# Patient Record
Sex: FEMALE | Race: WHITE | NOT HISPANIC OR LATINO | Employment: OTHER | URBAN - METROPOLITAN AREA
[De-identification: names, ages, dates, MRNs, and addresses within clinical notes are randomized per-mention and may not be internally consistent; named-entity substitution may affect disease eponyms.]

---

## 2023-05-17 DIAGNOSIS — R19.5 POSITIVE COLORECTAL CANCER SCREENING USING COLOGUARD TEST: Primary | ICD-10-CM

## 2023-05-17 DIAGNOSIS — Z86.010 HISTORY OF COLON POLYPS: ICD-10-CM

## 2023-06-21 NOTE — PROGRESS NOTES
Clinic Note    Reason for visit:  The primary encounter diagnosis was Dysphagia, unspecified type. Diagnoses of Chronic idiopathic constipation, Positive colorectal cancer screening using Cologuard test, and History of colon polyps were also pertinent to this visit.    PCP: Eze Mishra   [address]  / [place] [address]    HPI:  This is a 82 y.o. female who is established- las seen 11/2016. Here for positive Cologuard.She does take a herbal laxative (puritans pride) daily for daily BM. She has had to take a laxative all of her life. Denies any F/H CRC, unintentional weight loss. She does report dysphagia mainly with pills. Having intermittent heartburn with inciting foods. Denies n/v, melena. She wishes to have an upper endoscopy with dilation.    4/28/2023 +Cologuard    11/16/2016 Colonoscopy- 5 polyps, medium IH TA, TVA, repeat colon in 2 years  She does have hx of Polyp with HGD in 2007      Review of Systems   Constitutional:  Negative for fatigue, fever and unexpected weight change.   HENT:  Negative for mouth sores, postnasal drip, sore throat and trouble swallowing.    Eyes:  Negative for pain, discharge and eye dryness.   Respiratory:  Negative for apnea, cough, choking, chest tightness, shortness of breath and wheezing.    Cardiovascular:  Negative for chest pain, palpitations and leg swelling.   Gastrointestinal:  Negative for abdominal distention, abdominal pain, anal bleeding, blood in stool, change in bowel habit, constipation, diarrhea, nausea, rectal pain, vomiting, reflux, fecal incontinence and change in bowel habit.   Genitourinary:  Negative for bladder incontinence, dysuria and hematuria.   Musculoskeletal:  Negative for arthralgias, back pain and joint swelling.   Integumentary:  Negative for color change and rash.   Allergic/Immunologic: Negative for environmental allergies and food allergies.   Neurological:  Negative for seizures and headaches.   Hematological:  Negative for  "adenopathy. Does not bruise/bleed easily.      Past Medical History:   Diagnosis Date    Depression     Dyslipidemia     HTN (hypertension)     Hypothyroidism, unspecified      Past Surgical History:   Procedure Laterality Date    EYE SURGERY Bilateral     HIP ARTHROPLASTY Bilateral     HYSTERECTOMY       Family History   Problem Relation Age of Onset    Febrile seizures Mother     Heart failure Father      Social History     Tobacco Use    Smoking status: Never    Smokeless tobacco: Never   Substance Use Topics    Alcohol use: Yes     Alcohol/week: 1.0 standard drink     Types: 1 Glasses of wine per week     Comment: 1 glass wine daily    Drug use: Never     Review of patient's allergies indicates:  No Known Allergies       Medication List with Changes/Refills   New Medications    SOD SULF-POT CHLORIDE-MAG SULF (SUTAB) 1.479-0.188- 0.225 GRAM TABLET    Take 12 tablets by mouth once daily. Take according to package instructions with indicated amount of water. No breakfast day before test. May substitute with Suprep, Clenpiq, Plenvu, Moviprep or GoLytely based on Rx plan and patient preference.   Current Medications    HYDROCHLOROTHIAZIDE (HYDRODIURIL) 12.5 MG TAB    Take 12.5 mg by mouth.    IPRATROPIUM (ATROVENT) 42 MCG (0.06 %) NASAL SPRAY        LEVOTHYROXINE (SYNTHROID) 75 MCG TABLET    Take 75 mcg by mouth.    METOPROLOL SUCCINATE (TOPROL-XL) 100 MG 24 HR TABLET    Take 100 mg by mouth.    POTASSIUM CHLORIDE (K-TAB) 20 MEQ    Take 20 mEq by mouth.    ROSUVASTATIN (CRESTOR) 10 MG TABLET    Take 10 mg by mouth.    VENLAFAXINE (EFFEXOR-XR) 75 MG 24 HR CAPSULE    Take 75 mg by mouth.        Vital Signs:  /72 (BP Location: Right arm, Patient Position: Sitting, BP Method: Medium (Automatic))   Pulse 63   Ht 5' 6" (1.676 m)   Wt 64.9 kg (143 lb)   SpO2 98%   BMI 23.08 kg/m²         Physical Exam  Vitals reviewed.   Constitutional:       General: She is awake. She is not in acute distress.     Appearance: " Normal appearance. She is well-developed. She is not ill-appearing, toxic-appearing or diaphoretic.   HENT:      Head: Normocephalic and atraumatic.      Nose: Nose normal.      Mouth/Throat:      Mouth: Mucous membranes are moist.      Pharynx: Oropharynx is clear. No oropharyngeal exudate or posterior oropharyngeal erythema.   Eyes:      General: Lids are normal. Gaze aligned appropriately. No scleral icterus.        Right eye: No discharge.         Left eye: No discharge.      Conjunctiva/sclera: Conjunctivae normal.   Neck:      Trachea: Trachea normal.   Cardiovascular:      Rate and Rhythm: Normal rate and regular rhythm.      Pulses:           Radial pulses are 2+ on the right side and 2+ on the left side.   Pulmonary:      Effort: Pulmonary effort is normal. No respiratory distress.      Breath sounds: No stridor. No wheezing.   Chest:      Chest wall: No tenderness.   Abdominal:      General: Bowel sounds are normal. There is no distension.      Palpations: Abdomen is soft. There is no fluid wave, hepatomegaly or mass.      Tenderness: There is no abdominal tenderness. There is no guarding or rebound.   Musculoskeletal:         General: No tenderness or deformity.      Cervical back: Full passive range of motion without pain and neck supple. No tenderness.      Right lower leg: No edema.      Left lower leg: No edema.   Lymphadenopathy:      Cervical: No cervical adenopathy.   Skin:     General: Skin is warm and dry.      Capillary Refill: Capillary refill takes less than 2 seconds.      Coloration: Skin is not cyanotic, jaundiced or pale.   Neurological:      General: No focal deficit present.      Mental Status: She is alert and oriented to person, place, and time.      Motor: No tremor.   Psychiatric:         Attention and Perception: Attention normal.         Mood and Affect: Mood and affect normal.         Speech: Speech normal.         Behavior: Behavior normal. Behavior is cooperative.          All  of the data above and below has been reviewed by myself and any further interpretations will be reflected in the assessment and plan.   The data includes review of external notes, and independent interpretation of lab results, procedures, x-rays, and imaging reports.      Assessment:  Dysphagia, unspecified type  -     Ambulatory Referral to External Surgery    Chronic idiopathic constipation    Positive colorectal cancer screening using Cologuard test  -     Ambulatory referral/consult to Gastroenterology  -     Ambulatory Referral to External Surgery    History of colon polyps  -     Ambulatory referral/consult to Gastroenterology  -     sod sulf-pot chloride-mag sulf (SUTAB) 1.479-0.188- 0.225 gram tablet; Take 12 tablets by mouth once daily. Take according to package instructions with indicated amount of water. No breakfast day before test. May substitute with Suprep, Clenpiq, Plenvu, Moviprep or GoLytely based on Rx plan and patient preference.  Dispense: 24 tablet; Refill: 0  -     Ambulatory Referral to External Surgery    + Cologuard- is over due for colonoscopy. She also has history of polyp w/ HGD in 2007. Will plan for repeat colonoscopy.  Constipation- on OTC laxative- has had to take this all her life. Discussed that this laxative does have senna and may be habit forming. She verb understanding.  Dysphagia- only with pills. Will plan for upper endoscopy to evaluate for stricture. Will dilate if able.     Recommendations:    Schedule Upper and lower endoscopy with Dr. Muro at [facility] with Sutab.      Risks, benefits, and alternatives of medical management, any associated procedures, and/or treatment discussed with the patient. Patient given opportunity to ask questions and voices understanding. Patient has elected to proceed with the recommended care modalities as discussed.    Instructed patient to notify my office if they have not been contacted within two weeks after any procedures, submitting any  samples (biopsies, blood, stool, urine, etc.) or after any imaging (X-ray, CT, MRI, etc.).     Follow up in about 1 year (around 6/22/2024).    Order summary:  Orders Placed This Encounter   Procedures    Ambulatory Referral to External Surgery          This document may have been created using a voice recognition transcribing system. Incorrect words or phrases may have been missed during proofreading. Please interpret accordingly or contact me for clarification.     Luz Maurice, NP

## 2023-06-22 ENCOUNTER — OFFICE VISIT (OUTPATIENT)
Dept: GASTROENTEROLOGY | Facility: CLINIC | Age: 83
End: 2023-06-22
Payer: MEDICARE

## 2023-06-22 VITALS
BODY MASS INDEX: 22.98 KG/M2 | SYSTOLIC BLOOD PRESSURE: 110 MMHG | HEART RATE: 63 BPM | WEIGHT: 143 LBS | DIASTOLIC BLOOD PRESSURE: 72 MMHG | HEIGHT: 66 IN | OXYGEN SATURATION: 98 %

## 2023-06-22 DIAGNOSIS — R13.10 DYSPHAGIA, UNSPECIFIED TYPE: Primary | ICD-10-CM

## 2023-06-22 DIAGNOSIS — K59.04 CHRONIC IDIOPATHIC CONSTIPATION: ICD-10-CM

## 2023-06-22 DIAGNOSIS — Z86.010 HISTORY OF COLON POLYPS: ICD-10-CM

## 2023-06-22 DIAGNOSIS — R19.5 POSITIVE COLORECTAL CANCER SCREENING USING COLOGUARD TEST: ICD-10-CM

## 2023-06-22 PROCEDURE — 99204 PR OFFICE/OUTPT VISIT, NEW, LEVL IV, 45-59 MIN: ICD-10-PCS | Mod: S$GLB,,, | Performed by: NURSE PRACTITIONER

## 2023-06-22 PROCEDURE — 99204 OFFICE O/P NEW MOD 45 MIN: CPT | Mod: S$GLB,,, | Performed by: NURSE PRACTITIONER

## 2023-06-22 RX ORDER — SOD SULF/POT CHLORIDE/MAG SULF 1.479 G
12 TABLET ORAL DAILY
Qty: 24 TABLET | Refills: 0 | Status: SHIPPED | OUTPATIENT
Start: 2023-06-22

## 2023-06-22 RX ORDER — HYDROCHLOROTHIAZIDE 12.5 MG/1
12.5 TABLET ORAL
COMMUNITY
Start: 2023-03-17

## 2023-06-22 RX ORDER — VENLAFAXINE HYDROCHLORIDE 75 MG/1
75 CAPSULE, EXTENDED RELEASE ORAL
COMMUNITY
Start: 2023-04-07

## 2023-06-22 RX ORDER — POTASSIUM CHLORIDE 1500 MG/1
20 TABLET, EXTENDED RELEASE ORAL
COMMUNITY
Start: 2023-03-17

## 2023-06-22 RX ORDER — ROSUVASTATIN CALCIUM 10 MG/1
10 TABLET, COATED ORAL
COMMUNITY
Start: 2023-05-01

## 2023-06-22 RX ORDER — METOPROLOL SUCCINATE 100 MG/1
100 TABLET, EXTENDED RELEASE ORAL
COMMUNITY
Start: 2023-03-17

## 2023-06-22 RX ORDER — LEVOTHYROXINE SODIUM 75 UG/1
75 TABLET ORAL
COMMUNITY
Start: 2023-05-25

## 2023-06-22 RX ORDER — IPRATROPIUM BROMIDE 42 UG/1
SPRAY, METERED NASAL
COMMUNITY
Start: 2023-06-20

## 2023-06-22 NOTE — PATIENT INSTRUCTIONS
Schedule Upper and lower endoscopy with Dr. Muro     Please notify my office if you have not been contacted within two weeks after any procedures, submitting any samples (biopsies, blood, stool, urine, etc.) or after any imaging (X-ray, CT, MRI, etc.).

## 2023-08-10 ENCOUNTER — TELEPHONE (OUTPATIENT)
Dept: GASTROENTEROLOGY | Facility: CLINIC | Age: 83
End: 2023-08-10
Payer: MEDICARE

## 2023-08-10 VITALS — HEIGHT: 66 IN | BODY MASS INDEX: 22.98 KG/M2 | WEIGHT: 143 LBS

## 2023-08-10 DIAGNOSIS — R19.5 POSITIVE COLORECTAL CANCER SCREENING USING COLOGUARD TEST: ICD-10-CM

## 2023-08-10 DIAGNOSIS — R13.10 DYSPHAGIA, UNSPECIFIED TYPE: Primary | ICD-10-CM

## 2023-08-10 DIAGNOSIS — Z86.010 HISTORY OF COLON POLYPS: ICD-10-CM

## 2023-08-10 NOTE — TELEPHONE ENCOUNTER
"[facility]  [address] [place] [address]  Phone: [phone]  Fax: [phone]    History & Physical         Provider: Dr. Lucero Muro    Patient Name: Hannah FLEMING (age):1940  83 y.o.           Gender: female   Phone: [phone]     Referring Physician: Eze Mishra     Vital Signs:   Height - 5' 6"  Weight - 143 lb  BMI -  23.08    Plan: EGD w/DIL/Colonoscopy @ [facility]    Encounter Diagnoses   Name Primary?    Dysphagia, unspecified type Yes    Positive colorectal cancer screening using Cologuard test     History of colon polyps            History:      Past Medical History:   Diagnosis Date    Depression     Dyslipidemia     HTN (hypertension)     Hypothyroidism, unspecified       Past Surgical History:   Procedure Laterality Date    EYE SURGERY Bilateral     HIP ARTHROPLASTY Bilateral     HYSTERECTOMY        Medication List with Changes/Refills   Current Medications    HYDROCHLOROTHIAZIDE (HYDRODIURIL) 12.5 MG TAB    Take 12.5 mg by mouth.    IPRATROPIUM (ATROVENT) 42 MCG (0.06 %) NASAL SPRAY        LEVOTHYROXINE (SYNTHROID) 75 MCG TABLET    Take 75 mcg by mouth.    METOPROLOL SUCCINATE (TOPROL-XL) 100 MG 24 HR TABLET    Take 100 mg by mouth.    POTASSIUM CHLORIDE (K-TAB) 20 MEQ    Take 20 mEq by mouth.    ROSUVASTATIN (CRESTOR) 10 MG TABLET    Take 10 mg by mouth.    SOD SULF-POT CHLORIDE-MAG SULF (SUTAB) 1.479-0.188- 0.225 GRAM TABLET    Take 12 tablets by mouth once daily. Take according to package instructions with indicated amount of water. No breakfast day before test. May substitute with Suprep, Clenpiq, Plenvu, Moviprep or GoLytely based on Rx plan and patient preference.    VENLAFAXINE (EFFEXOR-XR) 75 MG 24 HR CAPSULE    Take 75 mg by mouth.      Review of patient's allergies indicates:  No Known Allergies   Family History   Problem Relation Age of Onset    Febrile seizures " Mother     Heart failure Father       Social History     Tobacco Use    Smoking status: Never    Smokeless tobacco: Never   Substance Use Topics    Alcohol use: Yes     Alcohol/week: 1.0 standard drink of alcohol     Types: 1 Glasses of wine per week     Comment: 1 glass wine daily    Drug use: Never        Physical Examination:     General Appearance:___________________________  HEENT: _____________________________________  Abdomen:____________________________________  Heart:________________________________________  Lungs:_______________________________________  Extremities:___________________________________  Skin:_________________________________________  Endocrine:____________________________________  Genitourinary:_________________________________  Neurological:__________________________________      Patient has been evaluated immediately prior to sedation and is medically cleared for endoscopy with IVCS as an ASA class: ______      Physician Signature: _________________________       Date: ________  Time: ________

## 2023-08-16 ENCOUNTER — OUTSIDE PLACE OF SERVICE (OUTPATIENT)
Dept: GASTROENTEROLOGY | Facility: CLINIC | Age: 83
End: 2023-08-16

## 2023-08-16 PROCEDURE — 43239 PR EGD, FLEX, W/BIOPSY, SGL/MULTI: ICD-10-PCS | Mod: 51,,, | Performed by: INTERNAL MEDICINE

## 2023-08-16 PROCEDURE — 45385 PR COLONOSCOPY,REMV LESN,SNARE: ICD-10-PCS | Mod: KX,,, | Performed by: INTERNAL MEDICINE

## 2023-08-16 PROCEDURE — 45385 COLONOSCOPY W/LESION REMOVAL: CPT | Mod: KX,,, | Performed by: INTERNAL MEDICINE

## 2023-08-16 PROCEDURE — 43239 EGD BIOPSY SINGLE/MULTIPLE: CPT | Mod: 51,,, | Performed by: INTERNAL MEDICINE

## 2023-08-16 PROCEDURE — 45380 PR COLONOSCOPY,BIOPSY: ICD-10-PCS | Mod: KX,59,, | Performed by: INTERNAL MEDICINE

## 2023-08-16 PROCEDURE — 45380 COLONOSCOPY AND BIOPSY: CPT | Mod: KX,59,, | Performed by: INTERNAL MEDICINE

## 2023-08-17 LAB — CRC RECOMMENDATION EXT: NORMAL

## 2023-08-24 ENCOUNTER — TELEPHONE (OUTPATIENT)
Dept: GASTROENTEROLOGY | Facility: CLINIC | Age: 83
End: 2023-08-24
Payer: MEDICARE

## 2023-08-24 DIAGNOSIS — K63.5 POLYP OF COLON, UNSPECIFIED PART OF COLON, UNSPECIFIED TYPE: Primary | ICD-10-CM

## 2023-08-24 NOTE — TELEPHONE ENCOUNTER
DBx chr foc act ditis, GBx react w/o Hp, EBx reflux, 15 polyps: 14 TA. DC lesion Bx TVA.    I will review with patient. Recommend repeat colonoscopy for EMR attempt and additional polypectomies with aggressive prep, adult colonoscope and no NSAIDs.  NBP

## 2023-08-28 RX ORDER — SOD SULF/POT CHLORIDE/MAG SULF 1.479 G
12 TABLET ORAL DAILY
Qty: 24 TABLET | Refills: 0 | Status: SHIPPED | OUTPATIENT
Start: 2023-08-28

## 2023-08-28 NOTE — TELEPHONE ENCOUNTER
Discussed with patient directly.  She agrees to repeat colonoscopy November 29, 2023 Wednesday.  She will do 2 days of clear liquids and MiraLax 1 capful 3 times a day the week before her next colonoscopy.  She will  her Sutab prescription and keep for November.  No NSAIDs the 2 weeks prior.  She requests instructions are written down and mailed to her home address. Add to Epic schedule.  СВЕТЛАНА

## 2023-08-29 NOTE — TELEPHONE ENCOUNTER
Wrote down on pt prep instructions 2 days of clear liquids and MiraLax 1 capful 3 times a day the week before her next colonoscopy. No NSAIDs the 2 weeks prior. Put in the mail and added to [facility] 11/29/23 schedule. joanne

## 2023-09-29 ENCOUNTER — DOCUMENTATION ONLY (OUTPATIENT)
Dept: GASTROENTEROLOGY | Facility: CLINIC | Age: 83
End: 2023-09-29
Payer: MEDICARE

## 2023-11-21 ENCOUNTER — TELEPHONE (OUTPATIENT)
Dept: GASTROENTEROLOGY | Facility: CLINIC | Age: 83
End: 2023-11-21
Payer: MEDICARE

## 2023-11-21 VITALS — WEIGHT: 143 LBS | BODY MASS INDEX: 22.98 KG/M2 | HEIGHT: 66 IN

## 2023-11-21 DIAGNOSIS — K63.5 POLYP OF COLON, UNSPECIFIED PART OF COLON, UNSPECIFIED TYPE: Primary | ICD-10-CM

## 2023-11-21 NOTE — TELEPHONE ENCOUNTER
[facility]  [address] [place] [address]  Phone: [phone]  Fax: [phone]    History & Physical         Provider: Dr. Lucero Muro    Patient Name: Hannah FLEMING (age):1940  83 y.o.           Gender: female   Phone: [phone]     Referring Physician: Eze Mishra     Vital Signs:   Height - 5' 6''  Weight - 143 LB  BMI - 23.08      Plan: Colonoscopy w/adult colonoscope and resection of       polyps @ [facility]    Encounter Diagnosis   Name Primary?    Polyp of colon, unspecified part of colon, unspecified type Yes           History:      Past Medical History:   Diagnosis Date    Depression     Dyslipidemia     HTN (hypertension)     Hypothyroidism, unspecified       Past Surgical History:   Procedure Laterality Date    EYE SURGERY Bilateral     HIP ARTHROPLASTY Bilateral     HYSTERECTOMY        Medication List with Changes/Refills   Current Medications    HYDROCHLOROTHIAZIDE (HYDRODIURIL) 12.5 MG TAB    Take 12.5 mg by mouth.    IPRATROPIUM (ATROVENT) 42 MCG (0.06 %) NASAL SPRAY        LEVOTHYROXINE (SYNTHROID) 75 MCG TABLET    Take 75 mcg by mouth.    METOPROLOL SUCCINATE (TOPROL-XL) 100 MG 24 HR TABLET    Take 100 mg by mouth.    POTASSIUM CHLORIDE (K-TAB) 20 MEQ    Take 20 mEq by mouth.    ROSUVASTATIN (CRESTOR) 10 MG TABLET    Take 10 mg by mouth.    SOD SULF-POT CHLORIDE-MAG SULF (SUTAB) 1.479-0.188- 0.225 GRAM TABLET    Take 12 tablets by mouth once daily. Take according to package instructions with indicated amount of water. No breakfast day before test. May substitute with Suprep, Clenpiq, Plenvu, Moviprep or GoLytely based on Rx plan and patient preference.    SOD SULF-POT CHLORIDE-MAG SULF (SUTAB) 1.479-0.188- 0.225 GRAM TABLET    Take 12 tablets by mouth once daily. Take according to package instructions with indicated amount of water. No breakfast day before test. May  substitute with Suprep, Clenpiq, Plenvu, Moviprep or GoLytely based on Rx plan and patient preference.    VENLAFAXINE (EFFEXOR-XR) 75 MG 24 HR CAPSULE    Take 75 mg by mouth.      Review of patient's allergies indicates:  No Known Allergies   Family History   Problem Relation Age of Onset    Febrile seizures Mother     Heart failure Father       Social History     Tobacco Use    Smoking status: Never    Smokeless tobacco: Never   Substance Use Topics    Alcohol use: Yes     Alcohol/week: 1.0 standard drink of alcohol     Types: 1 Glasses of wine per week     Comment: 1 glass wine daily    Drug use: Never        Physical Examination:     General Appearance:___________________________  HEENT: _____________________________________  Abdomen:____________________________________  Heart:________________________________________  Lungs:_______________________________________  Extremities:___________________________________  Skin:_________________________________________  Endocrine:____________________________________  Genitourinary:_________________________________  Neurological:__________________________________      Patient has been evaluated immediately prior to sedation and is medically cleared for endoscopy with IVCS as an ASA class: ______      Physician Signature: _________________________       Date: ________  Time: ________

## 2023-11-27 NOTE — TELEPHONE ENCOUNTER
Raghu/ pt and told her that I was calling as a courtesy regarding her upcoming Colonoscopy with NBP on 11/29/23 Wed and wanted to verify that she had her prep instructions and meds. Pt stated she has both and is ready for the procedure. She even mentioned that she started the 2 day CLD  and MiraLAX. I also mentioned that [facility] will call her on Tuesday with her arrival time and the GI Lab is located on the third floor. joanne   No

## 2023-11-29 ENCOUNTER — OUTSIDE PLACE OF SERVICE (OUTPATIENT)
Dept: GASTROENTEROLOGY | Facility: CLINIC | Age: 83
End: 2023-11-29
Payer: MEDICARE

## 2023-11-29 LAB — CRC RECOMMENDATION EXT: NORMAL

## 2023-11-29 PROCEDURE — 45385 COLONOSCOPY W/LESION REMOVAL: CPT | Mod: ,,, | Performed by: INTERNAL MEDICINE

## 2023-11-29 PROCEDURE — 45385 PR COLONOSCOPY,REMV LESN,SNARE: ICD-10-PCS | Mod: ,,, | Performed by: INTERNAL MEDICINE

## 2023-11-30 ENCOUNTER — TELEPHONE (OUTPATIENT)
Dept: GASTROENTEROLOGY | Facility: CLINIC | Age: 83
End: 2023-11-30

## 2023-11-30 NOTE — TELEPHONE ENCOUNTER
Send genetics referral for greater than 20 colonic polyps.  Notify patient that I would like for her to try Linzess 290 mcg samples.  She takes some once a day.  The goal would be to get her off of the over-the-counter stimulant laxative she is currently taking.  Awaiting pathology results before contacting her with the above.  NBP

## 2023-12-14 ENCOUNTER — TELEPHONE (OUTPATIENT)
Dept: GASTROENTEROLOGY | Facility: CLINIC | Age: 83
End: 2023-12-14
Payer: MEDICARE

## 2023-12-15 NOTE — TELEPHONE ENCOUNTER
18 polyps (5 >1 cm): 1 TVA (19mm piecemeal), 16 TA, 1 hyp.    Notify patient that her colon polyps were benign. I rec genetic testing given 30+ precancerous polyps. Will discuss repeat colonoscopy with aggressive prep and adult colonoscope at tertiary center after those results have returned. Send referral.  NBP

## 2023-12-21 ENCOUNTER — DOCUMENTATION ONLY (OUTPATIENT)
Dept: GASTROENTEROLOGY | Facility: CLINIC | Age: 83
End: 2023-12-21
Payer: MEDICARE

## 2024-02-26 ENCOUNTER — TELEPHONE (OUTPATIENT)
Dept: GASTROENTEROLOGY | Facility: CLINIC | Age: 84
End: 2024-02-26

## 2024-02-27 NOTE — TELEPHONE ENCOUNTER
I was notified last month that patient qualified for genetic testing. Did she submit that sample? If so, then get result from [facility].  СВЕТЛАНА

## 2024-03-01 NOTE — TELEPHONE ENCOUNTER
Called patient and she stated she was contacted by the genetic testing office. She did go to the office and answer some questions. They told her they contact her after that visit and patient stated she has not heard anything. 3/1/24 SENIAA

## 2024-05-20 ENCOUNTER — TELEPHONE (OUTPATIENT)
Dept: GASTROENTEROLOGY | Facility: CLINIC | Age: 84
End: 2024-05-20

## 2024-05-27 NOTE — TELEPHONE ENCOUNTER
4/8/2024 Ivanna gates.    Notify patient that I reviewed her genetics results which were normal. I recommend she is referred to a tertiary center to be evaluated for her follow up colonoscopy (with aggressive prep and adult colonoscope). If she agrees, then may send referral. Does she have a preference on location?  NBP  
Called and spoke with patient gave her results, she states that she does nit have a preference of where she would want to go but she said where ever we decide to send her to let her know first so she can decide if she wants to go first or not .  reema  
Joo with Dr. Sanches (advanced endoscopist). Okay to send referral if she agrees.  СВЕТЛАНА  
Pt states she is not sure about moving forward with referral and asked that we mail her all this information.     Dr. Juan Francisco Sanches MD  Gastroenterology  [address], [place] [address] [phone]   Office Hours  Mon - Fri 7:30 AM - 4:00 PM  
[facility]

## 2024-06-24 NOTE — PROGRESS NOTES
Clinic Note    Reason for visit:  The primary encounter diagnosis was Constipation, unspecified constipation type. A diagnosis of History of colon polyps was also pertinent to this visit.    PCP: Eze Mishra       HPI:  This is a 83 y.o. female here for follow up. Patient with multiple colon polyps. Nees referral to Dr. Sanches. She still has constipation. Does not have a BM unless she takes senna laxative. She has been out of her laxative and has been taking MiraLax 1 capful daily. She will only have a BM with enema. No blood in stool.    4/8/2024 Colaris AP nl     Colonoscopy 11/29/2023 18 polyps (5 >1 cm): 1 TVA (19mm piecemeal), 16 TA, 1 hyp. Repeat colonoscopy in 3-6 months with advanced endoscopist.    EGD/Colonoscopy 8/17/2023 DBx chr foc act ditis, GBx react w/o Hp, EBx reflux, melanosis coli, 15 polyps: 14 TA. DC lesion Bx TVA.     hx of Polyp with HGD in 2007     Review of Systems   Constitutional:  Positive for diaphoresis. Negative for fatigue, fever and unexpected weight change.   HENT:  Negative for mouth sores, postnasal drip, sore throat and trouble swallowing.    Eyes:  Negative for pain, discharge and eye dryness.   Respiratory:  Negative for apnea, cough, choking, chest tightness, shortness of breath and wheezing.    Cardiovascular:  Negative for chest pain, palpitations and leg swelling.   Gastrointestinal:  Positive for constipation and fecal incontinence. Negative for abdominal distention, abdominal pain, anal bleeding, blood in stool, change in bowel habit, diarrhea, nausea, rectal pain, vomiting and reflux.   Genitourinary:  Negative for bladder incontinence, dysuria and hematuria.   Musculoskeletal:  Negative for arthralgias, back pain and joint swelling.   Integumentary:  Negative for color change and rash.   Allergic/Immunologic: Negative for environmental allergies and food allergies.   Neurological:  Negative for seizures and headaches.   Hematological:  Negative for adenopathy.  "Does not bruise/bleed easily.        Past Medical History:   Diagnosis Date    Depression     Dyslipidemia     High cholesterol     HTN (hypertension)     Hypothyroidism, unspecified     Personal history of colonic polyps     Colaris AP nl     Past Surgical History:   Procedure Laterality Date    EYE SURGERY Bilateral     HIP ARTHROPLASTY Bilateral     HYSTERECTOMY       Family History   Problem Relation Name Age of Onset    Febrile seizures Mother      Heart failure Father      Colon cancer Neg Hx      Crohn's disease Neg Hx      Esophageal cancer Neg Hx      Irritable bowel syndrome Neg Hx      Liver cancer Neg Hx      Liver disease Neg Hx      Stomach cancer Neg Hx      Rectal cancer Neg Hx       Social History     Tobacco Use    Smoking status: Never    Smokeless tobacco: Never   Substance Use Topics    Alcohol use: Yes     Alcohol/week: 1.0 standard drink of alcohol     Types: 1 Glasses of wine per week     Comment: 1 glass wine daily    Drug use: Never     Review of patient's allergies indicates:  No Known Allergies     Medication List with Changes/Refills   Current Medications    HYDROCHLOROTHIAZIDE (HYDRODIURIL) 12.5 MG TAB    Take 12.5 mg by mouth.    IPRATROPIUM (ATROVENT) 42 MCG (0.06 %) NASAL SPRAY        LEVOTHYROXINE (SYNTHROID) 75 MCG TABLET    Take 75 mcg by mouth.    METOPROLOL SUCCINATE (TOPROL-XL) 100 MG 24 HR TABLET    Take 100 mg by mouth.    POTASSIUM CHLORIDE (K-TAB) 20 MEQ    Take 20 mEq by mouth.    ROSUVASTATIN (CRESTOR) 10 MG TABLET    Take 10 mg by mouth.    VENLAFAXINE (EFFEXOR-XR) 75 MG 24 HR CAPSULE    Take 75 mg by mouth.         Vital Signs:  /69 (BP Location: Left arm, Patient Position: Sitting)   Pulse 68   Ht 5' 6" (1.676 m)   Wt 64.1 kg (141 lb 6.4 oz)   SpO2 98%   BMI 22.82 kg/m²         Physical Exam  Vitals reviewed.   Constitutional:       General: She is awake. She is not in acute distress.     Appearance: Normal appearance. She is well-developed. She is not " ill-appearing, toxic-appearing or diaphoretic.   HENT:      Head: Normocephalic and atraumatic.      Nose: Nose normal.      Mouth/Throat:      Mouth: Mucous membranes are moist.      Pharynx: Oropharynx is clear. No oropharyngeal exudate or posterior oropharyngeal erythema.   Eyes:      General: Lids are normal. Gaze aligned appropriately. No scleral icterus.        Right eye: No discharge.         Left eye: No discharge.      Conjunctiva/sclera: Conjunctivae normal.   Neck:      Trachea: Trachea normal.   Cardiovascular:      Rate and Rhythm: Normal rate and regular rhythm.      Pulses:           Radial pulses are 2+ on the right side and 2+ on the left side.   Pulmonary:      Effort: Pulmonary effort is normal. No respiratory distress.      Breath sounds: No stridor. No wheezing.   Chest:      Chest wall: No tenderness.   Abdominal:      General: Bowel sounds are normal. There is no distension.      Palpations: Abdomen is soft. There is no fluid wave, hepatomegaly or mass.      Tenderness: There is no abdominal tenderness. There is no guarding or rebound.   Musculoskeletal:         General: No tenderness or deformity.      Cervical back: Full passive range of motion without pain and neck supple. No tenderness.      Right lower leg: No edema.      Left lower leg: No edema.   Lymphadenopathy:      Cervical: No cervical adenopathy.   Skin:     General: Skin is warm and dry.      Capillary Refill: Capillary refill takes less than 2 seconds.      Coloration: Skin is not cyanotic, jaundiced or pale.   Neurological:      General: No focal deficit present.      Mental Status: She is alert and oriented to person, place, and time.      Motor: No tremor.   Psychiatric:         Attention and Perception: Attention normal.         Mood and Affect: Mood and affect normal.         Speech: Speech normal.         Behavior: Behavior normal. Behavior is cooperative.            All of the data above and below has been reviewed by  myself and any further interpretations will be reflected in the assessment and plan.   The data includes review of external notes, and independent interpretation of lab results, procedures, x-rays, and imaging reports.      Assessment:  Constipation, unspecified constipation type    History of colon polyps  -     Ambulatory referral/consult to Gastroenterology; Future; Expected date: 07/02/2024         Hx multiple colon polyps. >30 in 1 year. Needs referral to advanced endoscopist.  Constiaption. Will give samples of linzess 290mcg.     Recommendations:    Refer to Dr. Sanches.  Samples given of Linzess 290mcg. Take once daily. Let us know if this works well for you and will send out prescription.    Risks, benefits, and alternatives of medical management, any associated procedures, and/or treatment discussed with the patient. Patient given opportunity to ask questions and voices understanding. Patient has elected to proceed with the recommended care modalities as discussed.    Instructed patient to notify my office if they have not been contacted within two weeks after any procedures, submitting any samples (biopsies, blood, stool, urine, etc.) or after any imaging (X-ray, CT, MRI, etc.).     Follow up in about 1 year (around 6/25/2025).    Order summary:  Orders Placed This Encounter   Procedures    Ambulatory referral/consult to Gastroenterology      This assessment, plan, and documentation was performed in collaboration with Luz Maurice NP.     This document may have been created using a voice recognition transcribing system. Incorrect words or phrases may have been missed during proofreading. Please interpret accordingly or contact me for clarification.     Lucero Muro MD

## 2024-06-25 ENCOUNTER — OFFICE VISIT (OUTPATIENT)
Dept: GASTROENTEROLOGY | Facility: CLINIC | Age: 84
End: 2024-06-25
Payer: MEDICARE

## 2024-06-25 ENCOUNTER — TELEPHONE (OUTPATIENT)
Dept: GASTROENTEROLOGY | Facility: CLINIC | Age: 84
End: 2024-06-25

## 2024-06-25 VITALS
WEIGHT: 141.38 LBS | SYSTOLIC BLOOD PRESSURE: 125 MMHG | HEART RATE: 68 BPM | HEIGHT: 66 IN | DIASTOLIC BLOOD PRESSURE: 69 MMHG | BODY MASS INDEX: 22.72 KG/M2 | OXYGEN SATURATION: 98 %

## 2024-06-25 DIAGNOSIS — Z86.010 HISTORY OF COLON POLYPS: ICD-10-CM

## 2024-06-25 DIAGNOSIS — K59.00 CONSTIPATION, UNSPECIFIED CONSTIPATION TYPE: Primary | ICD-10-CM

## 2024-06-25 PROCEDURE — 99214 OFFICE O/P EST MOD 30 MIN: CPT | Mod: S$GLB,,, | Performed by: INTERNAL MEDICINE

## 2024-06-25 NOTE — TELEPHONE ENCOUNTER
Referral for Dr. Benjamin was faxed to his office with patients ov notes, colon report, and path report. 6/25/24 LRA

## 2024-06-25 NOTE — PATIENT INSTRUCTIONS
Refer to Dr. Sanches.  Samples given of Linzess [facility]. Take once daily. Let us know if this works well for you and will send out prescription    Please notify my office if you have not been contacted within two weeks after any procedures, submitting any samples (biopsies, blood, stool, urine, etc.) or after any imaging (X-ray, CT, MRI, etc.).

## 2024-07-26 ENCOUNTER — TELEPHONE (OUTPATIENT)
Dept: GASTROENTEROLOGY | Facility: CLINIC | Age: 84
End: 2024-07-26
Payer: MEDICARE

## 2024-07-26 NOTE — TELEPHONE ENCOUNTER
Dr. Mishra's office called and asked us to fax over the last colon report and last labs. Patients records was faxed over. 7/26/24 LRA

## 2024-07-29 ENCOUNTER — TELEPHONE (OUTPATIENT)
Dept: GASTROENTEROLOGY | Facility: CLINIC | Age: 84
End: 2024-07-29
Payer: MEDICARE

## 2024-07-30 NOTE — TELEPHONE ENCOUNTER
Contact Dr. Sanches's office for updated on our referral sent to him about one month ago.  Were they able to get the patient scheduled for her colonoscopy with Dr. Sanches?  NBP

## 2024-07-31 NOTE — TELEPHONE ENCOUNTER
Contacted Dr. Sanches's office and was informed that referral was received and patient is scheduled for colonoscopy with Dr. Sanches on August 13th.

## 2024-09-05 ENCOUNTER — TELEPHONE (OUTPATIENT)
Dept: GASTROENTEROLOGY | Facility: CLINIC | Age: 84
End: 2024-09-05
Payer: MEDICARE

## 2024-09-06 NOTE — TELEPHONE ENCOUNTER
Called and spoke with patient. She was notified of NBP's remarks. She said Dr. Sanches told her himself that she did not need any more colonoscopies. Patient's  heard him say that too. Patient would like for NBP to double check with Dr. Sanches to see if she actually needs to do a repeat colonoscopy or not. She is aware that his office would be calling to arrange it if so. DMP

## 2024-09-10 ENCOUNTER — TELEPHONE (OUTPATIENT)
Dept: GASTROENTEROLOGY | Facility: CLINIC | Age: 84
End: 2024-09-10
Payer: MEDICARE

## 2024-09-10 NOTE — TELEPHONE ENCOUNTER
----- Message from Patt Dawson sent at 9/6/2024 10:01 AM CDT -----  Contact: Hannah  Type:  Patient Returning Call    Who Called:Hannah  Who Left Message for Patient: unknown  Does the patient know what this is regarding?:Colonoscopy procedure  Would the patient rather a call back or a response via [facility]? call  Best Call Back Number:[phone]  Additional Information: Patient receiving a call regarding colonoscopy procedure and request to discuss further.   Thank you,  GH

## 2024-09-10 NOTE — TELEPHONE ENCOUNTER
Contacted patient and let her know that we did send a message to the providers to review her chart and see if colonoscopy is needed going forward. Informed patient that once Dr. Muro reviews and makes a decision, we will contact her to let her know. Patient states understanding. BF

## 2024-09-18 NOTE — TELEPHONE ENCOUNTER
Notify the patient that I reviewed her case directly with Dr. Sanches today.  He recommends a repeat colonoscopy.  Provide his office number to the patient for her to contact them and have set up for repeat colonoscopy.  A new referral should not be needed.  However, if anything else is needed from us to help aid the patient then she can notify us.  NBP

## 2025-07-10 ENCOUNTER — TELEPHONE (OUTPATIENT)
Dept: GASTROENTEROLOGY | Facility: CLINIC | Age: 85
End: 2025-07-10

## 2025-07-10 ENCOUNTER — OFFICE VISIT (OUTPATIENT)
Dept: GASTROENTEROLOGY | Facility: CLINIC | Age: 85
End: 2025-07-10
Payer: MEDICARE

## 2025-07-10 VITALS
BODY MASS INDEX: 22.92 KG/M2 | SYSTOLIC BLOOD PRESSURE: 132 MMHG | DIASTOLIC BLOOD PRESSURE: 57 MMHG | OXYGEN SATURATION: 99 % | WEIGHT: 142 LBS | HEART RATE: 67 BPM

## 2025-07-10 DIAGNOSIS — Z86.0100 HISTORY OF COLON POLYPS: ICD-10-CM

## 2025-07-10 DIAGNOSIS — K59.00 CONSTIPATION, UNSPECIFIED CONSTIPATION TYPE: Primary | ICD-10-CM

## 2025-07-10 PROCEDURE — 99214 OFFICE O/P EST MOD 30 MIN: CPT | Mod: S$PBB,,, | Performed by: INTERNAL MEDICINE

## 2025-07-10 RX ORDER — NAPROXEN SODIUM 220 MG/1
81 TABLET, FILM COATED ORAL EVERY MORNING
COMMUNITY

## 2025-07-10 RX ORDER — SOD SULF/POT CHLORIDE/MAG SULF 1.479 G
TABLET ORAL
Qty: 24 TABLET | Refills: 0 | Status: CANCELLED | OUTPATIENT
Start: 2025-07-10

## 2025-07-10 RX ORDER — PSYLLIUM HUSK 0.4 G
1 CAPSULE ORAL
COMMUNITY

## 2025-07-10 NOTE — TELEPHONE ENCOUNTER
Patient was given AVS with apt details. Patient referral to Dr. Feldman was faxed with office notes. 7/10/25 LRA

## 2025-07-10 NOTE — PROGRESS NOTES
Clinic Note    Reason for visit:  The primary encounter diagnosis was Constipation, unspecified constipation type. A diagnosis of History of colon polyps was also pertinent to this visit.    PCP: Eze Mishra       HPI:  This is a 84 y.o. female here for follow up. Patient with multiple colon polyps. She had a Colonoscopy 8/2024 and was recommended for a repeat colonoscopy. She thinks she was told after her colonoscopy that she did not need a repeat due to her age. She has constipation and has to take Senna or enema for a BM. Shew as given Linzess 290mcg samples last OV but states she doesn't remember taking.    4/8/2024 Colaris AP nl     8/13/2024 [facility] colon: prep fair, 2cm AC EMR, diffuse severe MC, repeat colon.  Path states cecal TA (2 pieces, 5mm).    Colonoscopy 11/29/2023 18 polyps (5 >1 cm): 1 TVA (19mm piecemeal), 16 TA, 1 hyp. Repeat colonoscopy in 3-6 months with advanced endoscopist.     EGD/Colonoscopy 8/17/2023 DBx chr foc act ditis, GBx react w/o Hp, EBx reflux, melanosis coli, 15 polyps: 14 TA. DC lesion Bx TVA.      hx of Polyp with HGD in 2007     Review of Systems   Constitutional:  Negative for fatigue, fever and unexpected weight change.   HENT:  Negative for mouth sores, postnasal drip, sore throat and trouble swallowing.    Eyes:  Negative for pain, discharge and eye dryness.   Respiratory:  Negative for apnea, cough, choking, chest tightness, shortness of breath and wheezing.    Cardiovascular:  Negative for chest pain, palpitations and leg swelling.   Gastrointestinal:  Negative for abdominal distention, abdominal pain, anal bleeding, blood in stool, change in bowel habit, constipation, diarrhea, nausea, rectal pain, vomiting, reflux and fecal incontinence.   Genitourinary:  Negative for bladder incontinence, dysuria and hematuria.   Musculoskeletal:  Negative for arthralgias, back pain and joint swelling.   Integumentary:  Negative for color change and rash.   Allergic/Immunologic:  Negative for environmental allergies and food allergies.   Neurological:  Negative for seizures and headaches.   Hematological:  Negative for adenopathy. Does not bruise/bleed easily.        Past Medical History:   Diagnosis Date    Depression     Dyslipidemia     High cholesterol     HTN (hypertension)     Hypothyroidism, unspecified     Personal history of colonic polyps     Colaris AP nl     Past Surgical History:   Procedure Laterality Date    EYE SURGERY Bilateral     HIP ARTHROPLASTY Bilateral     HYSTERECTOMY       Family History   Problem Relation Name Age of Onset    Febrile seizures Mother      Heart failure Father      Colon cancer Neg Hx      Crohn's disease Neg Hx      Esophageal cancer Neg Hx      Irritable bowel syndrome Neg Hx      Liver cancer Neg Hx      Liver disease Neg Hx      Stomach cancer Neg Hx      Rectal cancer Neg Hx       Social History[1]  Review of patient's allergies indicates:  No Known Allergies     Medication List with Changes/Refills   New Medications    LINACLOTIDE (LINZESS) 290 MCG CAP CAPSULE    Take 1 capsule (290 mcg total) by mouth before breakfast.   Current Medications    ASPIRIN 81 MG CHEW    Take 81 mg by mouth every morning.    CALCIUM-VITAMIN D3 (OS-JACKIE 500 + D3) 500 MG-5 MCG (200 UNIT) PER TABLET    Take 1 tablet by mouth.    HYDROCHLOROTHIAZIDE (HYDRODIURIL) 12.5 MG TAB    Take 12.5 mg by mouth.    IPRATROPIUM (ATROVENT) 42 MCG (0.06 %) NASAL SPRAY        LEVOTHYROXINE (SYNTHROID) 75 MCG TABLET    Take 75 mcg by mouth.    METOPROLOL SUCCINATE (TOPROL-XL) 100 MG 24 HR TABLET    Take 100 mg by mouth.    POTASSIUM CHLORIDE (K-TAB) 20 MEQ    Take 20 mEq by mouth.    ROSUVASTATIN (CRESTOR) 10 MG TABLET    Take 10 mg by mouth.    VENLAFAXINE (EFFEXOR-XR) 75 MG 24 HR CAPSULE    Take 75 mg by mouth.         Vital Signs:  BP (!) 132/57 (BP Location: Left arm, Patient Position: Sitting)   Pulse 67   Wt 64.4 kg (142 lb)   SpO2 99%   BMI 22.92 kg/m²         Physical  Exam  Vitals reviewed.   Constitutional:       General: She is awake. She is not in acute distress.     Appearance: Normal appearance. She is well-developed. She is not ill-appearing, toxic-appearing or diaphoretic.   HENT:      Head: Normocephalic and atraumatic.      Nose: Nose normal.      Mouth/Throat:      Mouth: Mucous membranes are moist.      Pharynx: Oropharynx is clear. No oropharyngeal exudate or posterior oropharyngeal erythema.   Eyes:      General: Lids are normal. Gaze aligned appropriately. No scleral icterus.        Right eye: No discharge.         Left eye: No discharge.      Conjunctiva/sclera: Conjunctivae normal.   Neck:      Trachea: Trachea normal.   Cardiovascular:      Rate and Rhythm: Normal rate and regular rhythm.      Pulses:           Radial pulses are 2+ on the right side and 2+ on the left side.   Pulmonary:      Effort: Pulmonary effort is normal. No respiratory distress.      Breath sounds: No stridor. No wheezing.   Chest:      Chest wall: No tenderness.   Abdominal:      General: Bowel sounds are normal. There is no distension.      Palpations: Abdomen is soft. There is no fluid wave, hepatomegaly or mass.      Tenderness: There is no abdominal tenderness. There is no guarding or rebound.   Musculoskeletal:         General: No tenderness or deformity.      Cervical back: No tenderness.      Right lower leg: No edema.      Left lower leg: No edema.   Lymphadenopathy:      Cervical: No cervical adenopathy.   Skin:     General: Skin is warm and dry.      Capillary Refill: Capillary refill takes less than 2 seconds.      Coloration: Skin is not cyanotic, jaundiced or pale.   Neurological:      General: No focal deficit present.      Mental Status: She is alert and oriented to person, place, and time.      Motor: No tremor.   Psychiatric:         Attention and Perception: Attention normal.         Mood and Affect: Mood and affect normal.         Speech: Speech normal.         Behavior:  Behavior normal. Behavior is cooperative.            All of the data above and below has been reviewed by myself and any further interpretations will be reflected in the assessment and plan.   The data includes review of external notes, and independent interpretation of lab results, procedures, x-rays, and imaging reports.      Assessment:  Constipation, unspecified constipation type  -     linaCLOtide (LINZESS) 290 mcg Cap capsule; Take 1 capsule (290 mcg total) by mouth before breakfast.  Dispense: 12 capsule; Refill: 0    History of colon polyps  -     Ambulatory referral/consult to Gastroenterology; Future; Expected date: 07/17/2025      Hx multiple colon polyps. >30 in 1 year. [facility] 2024 with recommendation for repeat colonoscopy. She does not want to go back to [place].    Constiaption. Will give samples of Linzess 290mcg daily.  She is hesitant for repeat colonoscopy but given how healthy she is she agrees for her repeat colonoscopy.  Will send repeat referral to [facility].    Recommendations:    Start Linzess 290mcg daily. Let me know your response once the capsules are completed.  Schedule colonoscopy with Dr. Sanches.    If any tests, procedures, or imaging has been ordered and you are not contacted to schedule within 1-2 weeks, then you may call the central scheduling department directly at [phone].     Risks, benefits, and alternatives of medical management, any associated procedures, and/or treatment discussed with the patient. Patient given opportunity to ask questions and voices understanding. Patient has elected to proceed with the recommended care modalities as discussed.    Instructed patient to notify my office if they have not been contacted within two weeks after any procedures, submitting any samples (biopsies, blood, stool, urine, etc.) or after any imaging (X-ray, CT, MRI, etc.).     Follow up if symptoms worsen or fail to improve.    Order summary:  Orders Placed This  Encounter   Procedures    Ambulatory referral/consult to Gastroenterology      This assessment, plan, and documentation was performed in collaboration with Luz Maurice NP.     This document may have been created using a voice recognition transcribing system. Incorrect words or phrases may have been missed during proofreading. Please interpret accordingly or contact me for clarification.     Lucero Muro MD         [1]   Social History  Tobacco Use    Smoking status: Never    Smokeless tobacco: Never   Substance Use Topics    Alcohol use: Yes     Alcohol/week: 1.0 standard drink of alcohol     Types: 1 Glasses of wine per week     Comment: 1 glass wine daily    Drug use: Never

## 2025-07-10 NOTE — PATIENT INSTRUCTIONS
Start Linzess 290mcg daily. Let me know your response once the capsules are completed.  Schedule colonoscopy with Dr. Sanches.    If any tests, procedures, or imaging has been ordered and you are not contacted to schedule within 1-2 weeks, then you may call the central scheduling department directly at [phone].   Please notify my office if you have not been contacted within two weeks after any procedures, submitting any samples (biopsies, blood, stool, urine, etc.) or after any imaging (X-ray, CT, MRI, etc.).

## 2025-07-10 NOTE — LETTER
July 10, 2025        Eze Mishra MD  [address]  [place] [address]             [facility]  [address] [address]  Phone: [phone]  Fax: [phone]   Patient: Hannah العراقي   MR Number: [id number]   YOB: 1940   Date of Visit: 7/10/2025       Dear Dr. Mishra:    Thank you for referring Hannah العراقي to me for evaluation. Attached you will find relevant portions of my assessment and plan of care.    If you have questions, please do not hesitate to call me. I look forward to following Hannah العراقي along with you.    Sincerely,      Lucero Muro MD            CC  No Recipients    Enclosure

## 2025-07-28 ENCOUNTER — TELEPHONE (OUTPATIENT)
Dept: GASTROENTEROLOGY | Facility: CLINIC | Age: 85
End: 2025-07-28
Payer: MEDICARE

## 2025-07-28 NOTE — TELEPHONE ENCOUNTER
Copied from CRM #[id number]. Topic: General Inquiry - Patient Advice  >> Jul 28, 2025  3:04 PM Denise wrote:  Type:  Needs Medical Advice    Who Called: Hannah العراقي   Symptoms (please be specific):    How long has patient had these symptoms:    Pharmacy name and phone #:    Would the patient rather a call back or a response via [facility]? Call back  Best Call Back Number: .[phone]   Additional Information: pt is needing to speak with , she doesn't want to tell me what it is about

## 2025-08-08 ENCOUNTER — TELEPHONE (OUTPATIENT)
Dept: GASTROENTEROLOGY | Facility: CLINIC | Age: 85
End: 2025-08-08
Payer: MEDICARE

## 2025-08-08 NOTE — TELEPHONE ENCOUNTER
Called patient due to Dr. Keon Sanches office not being able to reach patient. I lvm for patient to call our office. Fax uploaded in  about not being able to reach her for her to be scheduled. 8/8/25 LRA

## 2025-08-09 ENCOUNTER — TELEPHONE (OUTPATIENT)
Dept: GASTROENTEROLOGY | Facility: CLINIC | Age: 85
End: 2025-08-09
Payer: MEDICARE